# Patient Record
Sex: MALE | Race: WHITE
[De-identification: names, ages, dates, MRNs, and addresses within clinical notes are randomized per-mention and may not be internally consistent; named-entity substitution may affect disease eponyms.]

---

## 2017-07-10 ENCOUNTER — HOSPITAL ENCOUNTER (OUTPATIENT)
Dept: HOSPITAL 35 - CAT | Age: 77
End: 2017-07-10
Attending: INTERNAL MEDICINE
Payer: COMMERCIAL

## 2017-07-10 DIAGNOSIS — R07.9: ICD-10-CM

## 2017-07-10 DIAGNOSIS — K46.9: ICD-10-CM

## 2017-07-10 DIAGNOSIS — G47.33: ICD-10-CM

## 2017-07-10 DIAGNOSIS — I71.4: Primary | ICD-10-CM

## 2017-07-10 DIAGNOSIS — I25.84: ICD-10-CM

## 2017-07-10 DIAGNOSIS — R25.1: ICD-10-CM

## 2017-07-10 DIAGNOSIS — R53.83: ICD-10-CM

## 2017-07-10 DIAGNOSIS — I10: ICD-10-CM

## 2017-07-10 LAB
ALBUMIN SERPL-MCNC: 3.7 G/DL (ref 3.4–5)
ALP SERPL-CCNC: 104 U/L (ref 46–116)
ALT SERPL-CCNC: 20 U/L (ref 30–65)
ANION GAP SERPL CALC-SCNC: 5 MMOL/L (ref 7–16)
AST SERPL-CCNC: 20 U/L (ref 15–37)
BILIRUB SERPL-MCNC: 0.4 MG/DL
BILIRUB UR-MCNC: NEGATIVE MG/DL
BUN SERPL-MCNC: 14 MG/DL (ref 7–18)
CALCIUM SERPL-MCNC: 9.3 MG/DL (ref 8.5–10.1)
CHLORIDE SERPL-SCNC: 106 MMOL/L (ref 98–107)
CO2 SERPL-SCNC: 30 MMOL/L (ref 21–32)
COLOR UR: YELLOW
CREAT SERPL-MCNC: 1.2 MG/DL (ref 0.7–1.3)
ERYTHROCYTE [DISTWIDTH] IN BLOOD BY AUTOMATED COUNT: 17.8 % (ref 10.5–14.5)
FREE T4: 1.08 NG/DL (ref 0.82–1.77)
GLUCOSE SERPL-MCNC: 89 MG/DL (ref 74–106)
HCT VFR BLD CALC: 39.2 % (ref 42–52)
HGB BLD-MCNC: 12.5 GM/DL (ref 14–18)
KETONES UR STRIP-MCNC: NEGATIVE MG/DL
MCH RBC QN AUTO: 27.1 PG (ref 26–34)
MCHC RBC AUTO-ENTMCNC: 31.9 G/DL (ref 28–37)
MCV RBC: 85 FL (ref 80–100)
NITRITE UR QL STRIP: NEGATIVE
PLATELET # BLD: 262 THOU/UL (ref 150–400)
POTASSIUM SERPL-SCNC: 4.4 MMOL/L (ref 3.5–5.1)
PROT SERPL-MCNC: 7.5 G/DL (ref 6.4–8.2)
RBC # BLD AUTO: 4.61 MIL/UL (ref 4.5–6)
RBC # UR STRIP: NEGATIVE /UL
SODIUM SERPL-SCNC: 141 MMOL/L (ref 136–145)
SP GR UR STRIP: 1.01 (ref 1–1.03)
URINE GLUCOSE-RANDOM*: NEGATIVE
URINE PROTEIN (DIPSTICK): NEGATIVE
UROBILINOGEN UR STRIP-ACNC: 0.2 E.U./DL (ref 0.2–1)
WBC # BLD AUTO: 6.9 THOU/UL (ref 4–11)

## 2017-07-11 LAB
EST. AVERAGE GLUCOSE BLD GHB EST-MCNC: 108 MG/DL
GLYCOHEMOGLOBIN (HGB A1C): 5.4 % (ref 4.8–5.6)

## 2018-06-27 ENCOUNTER — HOSPITAL ENCOUNTER (OUTPATIENT)
Dept: HOSPITAL 35 - RAD | Age: 78
End: 2018-06-27
Attending: SPECIALIST
Payer: COMMERCIAL

## 2018-06-27 DIAGNOSIS — M47.892: ICD-10-CM

## 2018-06-27 DIAGNOSIS — K44.9: Primary | ICD-10-CM

## 2018-06-27 DIAGNOSIS — M41.86: ICD-10-CM

## 2020-02-20 ENCOUNTER — HOSPITAL ENCOUNTER (OUTPATIENT)
Dept: HOSPITAL 35 - ULTRA | Age: 80
End: 2020-02-20
Attending: FAMILY MEDICINE
Payer: COMMERCIAL

## 2020-02-20 DIAGNOSIS — R22.1: Primary | ICD-10-CM

## 2020-02-27 ENCOUNTER — HOSPITAL ENCOUNTER (OUTPATIENT)
Dept: HOSPITAL 35 - CAT | Age: 80
End: 2020-02-27
Attending: FAMILY MEDICINE
Payer: COMMERCIAL

## 2020-02-27 DIAGNOSIS — K11.8: Primary | ICD-10-CM

## 2020-02-27 LAB
BUN SERPL-MCNC: 29 MG/DL (ref 7–18)
CREAT SERPL-MCNC: 1.3 MG/DL (ref 0.7–1.3)

## 2020-06-18 ENCOUNTER — HOSPITAL ENCOUNTER (OUTPATIENT)
Dept: HOSPITAL 35 - ULTRA | Age: 80
Discharge: HOME | End: 2020-06-18
Attending: OTOLARYNGOLOGY
Payer: COMMERCIAL

## 2020-06-18 DIAGNOSIS — Z79.899: ICD-10-CM

## 2020-06-18 DIAGNOSIS — K11.8: Primary | ICD-10-CM

## 2020-06-18 DIAGNOSIS — R22.1: ICD-10-CM

## 2020-06-18 DIAGNOSIS — Z79.82: ICD-10-CM

## 2020-06-19 NOTE — PATH
[facility]
[address] [place]   [address]                     PATHOLOGY RPT PROCEDURE       
_______________________________________________________________________________
 
Name:       JULIANN KUMAR            Room #:                     REG [facility].#:      [id number]     Account #:      [id number]  
Admission:  06/18/20    Date of Birth:  [birth year]  
Discharge:                                              Report #:    [id number]
                                                        Path Case #: [id number]
_______________________________________________________________________________
Note
[facility] Accession Number: [id number]
   TESTS               RESULT  FLAG  UNITS    REF RANGE  LAB
------------------------------------------------------------
   Clinician Provided Cytology Information
   No. of containers..01 Other (Miscellaneous)
Source:                                                   01
   RT PAROTID MASS
DIAGNOSIS:                                                02
   RIGHT PAROTID MASS, FINE NEEDLE ASPIRATION
   INCONCLUSIVE.
   THIS INTERPRETATION INCLUDES EVALUATION OF A CELL BLOCK.
   Comment:
   Examination shows numerous lymphocytes with no other epithelial cells.
   Findings may be suggestive of an intraparotid lymph node, atypical
   lymphoid population involving a lymph node within the parotid, partially
   sampled Warthin's tumor amongst other possibilities. Apocrine epithelium
   is not identified to suggest Warthin's tumor; however lack of the same
   does not argue against one due to the inherent sampling bias. There is no
   salivary gland tissue present. Findings are inconclusive. Please correlate
   clinically and follow-up as indicated.
Pathologist ICD10:                                        02
   R22.1
Signed out by:                                            02
   Briana Maya MD, Pathologist
   NPI- [id number]
Performed by:                                             01
   Lizzette Srivastava, Cytotechnologist ([facility])
Gross description:                                        01
   25ML, CLEAR COLORLESS, 4 FX 4 AD
   /LCS  06/18/2020  1725 Local
 
------------------------------------------------------------
    FLAG LEGEND:
    L-Low Normal,H-High Normal,LL-Alert Low,HH-Alert High
    <-Panic Low,>-Panic High,A-Abnormal,AA-Critical Abnormal
------------------------------------------------------------
 
Performed at:
01 [facility]
   [place]  [address]
   Javier Osullivan MD, Phone: [phone]
[address] [facility]  [address]
   Briana Maya MD, Phone: [phone]
Specimen Comment: A courtesy copy of this report has been sent to [phone],
[id number]   [address]                     PATHOLOGY RPT PROCEDURE       
_______________________________________________________________________________
 
Name:       KUMARJULIANN HINDS            Room #:                     REG CLI 
M.R.#:      [id number]     Account #:      [id number]  
Admission:  06/18/20    Date of Birth:  [birth year]  
Discharge:                                              Report #:    [id number]
                                                        Path Case #: [id number]
_______________________________________________________________________________
Specimen Comment: [id number]
Specimen Comment: Report sent to  / DR REYNOSO
***Performed at:  01
   [facility] [place]
   [address] [place] [address] [address], [place], [place]  [id number]
   MD Javier Osullivan MD Phone:  [phone]

## 2021-06-14 ENCOUNTER — HOSPITAL ENCOUNTER (OUTPATIENT)
Dept: HOSPITAL 35 - RAD | Age: 81
End: 2021-06-14
Attending: INTERNAL MEDICINE
Payer: COMMERCIAL

## 2021-06-14 DIAGNOSIS — J90: Primary | ICD-10-CM

## 2021-06-14 DIAGNOSIS — J44.9: ICD-10-CM

## 2021-06-14 DIAGNOSIS — K44.9: ICD-10-CM
